# Patient Record
Sex: FEMALE | Race: WHITE | NOT HISPANIC OR LATINO | Employment: FULL TIME | URBAN - METROPOLITAN AREA
[De-identification: names, ages, dates, MRNs, and addresses within clinical notes are randomized per-mention and may not be internally consistent; named-entity substitution may affect disease eponyms.]

---

## 2019-11-14 ENCOUNTER — HOSPITAL ENCOUNTER (EMERGENCY)
Facility: HOSPITAL | Age: 39
Discharge: HOME | End: 2019-11-14
Attending: EMERGENCY MEDICINE
Payer: COMMERCIAL

## 2019-11-14 VITALS
WEIGHT: 140 LBS | OXYGEN SATURATION: 95 % | SYSTOLIC BLOOD PRESSURE: 122 MMHG | RESPIRATION RATE: 18 BRPM | DIASTOLIC BLOOD PRESSURE: 60 MMHG | TEMPERATURE: 99 F | HEART RATE: 90 BPM

## 2019-11-14 DIAGNOSIS — J40 BRONCHITIS: Primary | ICD-10-CM

## 2019-11-14 DIAGNOSIS — B34.9 ACUTE VIRAL SYNDROME: ICD-10-CM

## 2019-11-14 DIAGNOSIS — M94.0 COSTOCHONDRITIS: ICD-10-CM

## 2019-11-14 DIAGNOSIS — R05.9 COUGH: ICD-10-CM

## 2019-11-14 LAB
B-HCG UR QL: NEGATIVE
CTP QC/QA: YES
INFLUENZA A, MOLECULAR: NEGATIVE
INFLUENZA B, MOLECULAR: NEGATIVE
SPECIMEN SOURCE: NORMAL

## 2019-11-14 PROCEDURE — 81025 URINE PREGNANCY TEST: CPT | Performed by: NURSE PRACTITIONER

## 2019-11-14 PROCEDURE — 87502 INFLUENZA DNA AMP PROBE: CPT

## 2019-11-14 PROCEDURE — 99284 EMERGENCY DEPT VISIT MOD MDM: CPT | Mod: 25

## 2019-11-14 PROCEDURE — 25000003 PHARM REV CODE 250: Performed by: NURSE PRACTITIONER

## 2019-11-14 RX ORDER — BENZONATATE 100 MG/1
100 CAPSULE ORAL 3 TIMES DAILY PRN
Qty: 20 CAPSULE | Refills: 0 | Status: SHIPPED | OUTPATIENT
Start: 2019-11-14 | End: 2019-11-24

## 2019-11-14 RX ORDER — FLUTICASONE PROPIONATE 50 MCG
1 SPRAY, SUSPENSION (ML) NASAL 2 TIMES DAILY PRN
Qty: 15 G | Refills: 0 | Status: SHIPPED | OUTPATIENT
Start: 2019-11-14

## 2019-11-14 RX ORDER — ACETAMINOPHEN 160 MG/5ML
650 SOLUTION ORAL
Status: COMPLETED | OUTPATIENT
Start: 2019-11-14 | End: 2019-11-14

## 2019-11-14 RX ADMIN — ACETAMINOPHEN 649.6 MG: 160 SUSPENSION ORAL at 10:11

## 2019-11-14 NOTE — ED NOTES
APPEARANCE: Alert, oriented and in no acute distress.  CARDIAC: Normal rate and rhythm, no murmur heard.   PERIPHERAL VASCULAR: peripheral pulses present. Normal cap refill. No edema. Warm to touch.    RESPIRATORY:. Respirations are equal and unlabored no obvious signs of distress.Nasal congestion noted   GASTRO: soft, bowel sounds normal, no tenderness, no abdominal distention.  MUSC: Full ROM. No bony tenderness or soft tissue tenderness. No obvious deformity.reports bodyaches  SKIN: Skin is warm dry intact. Afebrile   NEURO: No neurological abnormalities.   MENTAL STATUS: awake, alert and aware of environment.  EYE: PERRL, both eyes

## 2019-11-14 NOTE — DISCHARGE INSTRUCTIONS
Increase your fluid intake. Use tylenol and motrin as directed on the labeling.  Return to the ED if your condition changes, progresses, or if you have any concerns.

## 2019-11-14 NOTE — ED PROVIDER NOTES
Encounter Date: 11/14/2019       History     Chief Complaint   Patient presents with    Generalized Body Aches     reports cough, nasal congestion, chest congestion, subjective fevers, generalized body aches started sunday.      39-year-old male presents the ED for cough, nasal congestion, chills, and body aches that started on Sunday.  The patient reports that she came to the ED today due to chest pain with coughing only.  She states that her cough is dry.  No shortness of breath or hemoptysis.  She has not had fever.  She denies history of asthma, pneumonia, and smoking.  She has not had any medication for her symptoms today.  No other complaints at this time.    The history is provided by the patient.     Review of patient's allergies indicates:  No Known Allergies  No past medical history on file.  No past surgical history on file.  No family history on file.  Social History     Tobacco Use    Smoking status: Not on file   Substance Use Topics    Alcohol use: Not on file    Drug use: Not on file     Review of Systems   Constitutional: Positive for activity change, chills and fatigue. Negative for fever.   HENT: Positive for congestion, postnasal drip and rhinorrhea. Negative for ear pain and sore throat.    Eyes: Negative for redness.   Respiratory: Positive for cough. Negative for shortness of breath.    Cardiovascular: Positive for chest pain (With cough only). Negative for palpitations and leg swelling.   Gastrointestinal: Negative for abdominal pain, diarrhea, nausea and vomiting.   Musculoskeletal: Positive for myalgias. Negative for back pain, neck pain and neck stiffness.   Skin: Negative for rash.   Allergic/Immunologic: Negative for immunocompromised state.   Neurological: Negative for dizziness, weakness, numbness and headaches.   Psychiatric/Behavioral: Negative for confusion.   All other systems reviewed and are negative.      Physical Exam     Initial Vitals [11/14/19 0847]   BP Pulse Resp Temp  SpO2   122/62 97 20 99 °F (37.2 °C) 95 %      MAP       --         Physical Exam    Nursing note and vitals reviewed.  Constitutional: Vital signs are normal. She appears well-developed and well-nourished. She is active and cooperative. She is easily aroused.  Non-toxic appearance. She does not have a sickly appearance. She appears ill. No distress.   HENT:   Head: Normocephalic and atraumatic.   Right Ear: External ear normal.   Left Ear: External ear normal.   Nose: Rhinorrhea present. No mucosal edema or sinus tenderness.   Mouth/Throat: Uvula is midline, oropharynx is clear and moist and mucous membranes are normal.   Postnasal drip   Eyes: Conjunctivae and EOM are normal.   Neck: Trachea normal, normal range of motion, full passive range of motion without pain and phonation normal. Neck supple.   Cardiovascular: Normal rate, regular rhythm and normal heart sounds.   Pulmonary/Chest: Effort normal and breath sounds normal. She exhibits tenderness. She exhibits no bony tenderness and no crepitus.   Reproducible anterior chest wall tenderness to palpation. No crepitus.   Abdominal: Soft. Normal appearance and bowel sounds are normal. She exhibits no distension. There is no tenderness. There is no rigidity, no rebound and no guarding.   Musculoskeletal:   No lower extremity edema.   Neurological: She is alert, oriented to person, place, and time and easily aroused. She has normal strength. GCS eye subscore is 4. GCS verbal subscore is 5. GCS motor subscore is 6.   Skin: Skin is warm, dry and intact. Capillary refill takes less than 2 seconds. No bruising and no rash noted. No erythema. No pallor.   Psychiatric: She has a normal mood and affect. Her speech is normal and behavior is normal. Judgment and thought content normal. Cognition and memory are normal.         ED Course   Procedures  Labs Reviewed   INFLUENZA A & B BY MOLECULAR   POCT URINE PREGNANCY          Imaging Results          X-Ray Chest PA And Lateral  (Final result)  Result time 11/14/19 11:12:55    Final result by Benji Corbett MD (11/14/19 11:12:55)                 Impression:      See above      Electronically signed by: Benji Corbett MD  Date:    11/14/2019  Time:    11:12             Narrative:    EXAMINATION:  XR CHEST PA AND LATERAL    CLINICAL HISTORY:  Cough    TECHNIQUE:  PA and lateral views of the chest were performed.    COMPARISON:  None    FINDINGS:  Cardiac size is normal.  Lungs are clear.  The colon is significantly distended in the abdomen.                                 Medical Decision Making:   Initial Assessment:   40yo female here for nasal congestion, cough, and chest pain with cough since Sunday.  No fever, shortness of breath, hemoptysis, or palpitations.  The patient appears ill but nontoxic.  Vitals stable.  No respiratory distress.  Reproducible anterior chest wall pain on exam.  Differential Diagnosis:   Bronchitis, URI, allergies, irritants, pulmonary edema, GERD, laryngitis, tracheitis, asthma, sinusitis, pneumonia, viral, COPD, medication side effect    Clinical Tests:   Lab Tests: Reviewed and Ordered  Radiological Study: Ordered and Reviewed  ED Management:  CXR, labs, PO tylenol  PERC negative. I do not suspect ACS.  Pt reports CP with cough only, suggesting MSK pain. CXR negative for acute changes.  Pt is tolerating oral fluids without difficulty. This is a 39 y.o. female  who presents to the ED today with cough, congestion, symptoms consistent with an URI. Gi Mcgee workup today does not show any sign of pneumonia on CXR. Likely this is a viral course that will need to run its course. The patient is feeling improved following interventions here in the ED. The patient feels back to baseline and is ready to go home. There is no need for emergent intervention at this time. I will discharge home with symptom control. The patient is comfortable with this plan. After taking into careful account the historical  factors and physical exam findings of the patient's presentation today, in conjunction with the empirical and objective data obtained on ED workup, no acute emergent medical condition has been identified. The patient appears to be low risk for an emergent medical condition and I feel it is safe and appropriate at this time for the patient to be discharged to follow-up as detailed in their discharge instructions for reevaluation and possible continued outpatient workup and management. Discharge and follow-up instructions discussed with the patient who expressed understanding and willingness to comply with my recommendations.    This record utilized Dragon voice recognition system.    Past medical records reviewed.      Pt to follow up with PCP within 2 days.  I reviewed strict return precautions. In addition, pt is to return to the ED if condition changes, progresses, or if there are any concerns.  Pt verbalized understanding, compliance, and agreement with the treatment plan.                                     Clinical Impression:       ICD-10-CM ICD-9-CM   1. Bronchitis J40 490   2. Cough R05 786.2   3. Acute viral syndrome B34.9 079.99   4. Costochondritis M94.0 733.6                             Vanessa Land, [place]  11/14/19 7127

## 2021-09-15 ENCOUNTER — CLINICAL SUPPORT (OUTPATIENT)
Dept: URGENT CARE | Facility: CLINIC | Age: 41
End: 2021-09-15
Payer: COMMERCIAL

## 2021-09-15 DIAGNOSIS — Z20.822 ENCOUNTER FOR LABORATORY TESTING FOR COVID-19 VIRUS: Primary | ICD-10-CM

## 2021-09-15 LAB
CTP QC/QA: YES
SARS-COV-2 RDRP RESP QL NAA+PROBE: NEGATIVE

## 2021-09-15 PROCEDURE — U0002: ICD-10-PCS | Mod: QW,S$GLB,, | Performed by: NURSE PRACTITIONER

## 2021-09-15 PROCEDURE — U0002 COVID-19 LAB TEST NON-CDC: HCPCS | Mod: QW,S$GLB,, | Performed by: NURSE PRACTITIONER

## 2025-06-02 ENCOUNTER — HOSPITAL ENCOUNTER (EMERGENCY)
Facility: HOSPITAL | Age: 45
Discharge: HOME | End: 2025-06-02
Attending: STUDENT IN AN ORGANIZED HEALTH CARE EDUCATION/TRAINING PROGRAM

## 2025-06-02 VITALS
RESPIRATION RATE: 16 BRPM | SYSTOLIC BLOOD PRESSURE: 111 MMHG | HEIGHT: 67 IN | DIASTOLIC BLOOD PRESSURE: 62 MMHG | HEART RATE: 87 BPM | OXYGEN SATURATION: 98 % | WEIGHT: 142 LBS | BODY MASS INDEX: 22.29 KG/M2 | TEMPERATURE: 98 F

## 2025-06-02 DIAGNOSIS — L03.116 LEFT LEG CELLULITIS: ICD-10-CM

## 2025-06-02 DIAGNOSIS — L02.419 LEG ABSCESS: Primary | ICD-10-CM

## 2025-06-02 PROCEDURE — 25000003 PHARM REV CODE 250: Performed by: STUDENT IN AN ORGANIZED HEALTH CARE EDUCATION/TRAINING PROGRAM

## 2025-06-02 PROCEDURE — 99284 EMERGENCY DEPT VISIT MOD MDM: CPT | Mod: 25

## 2025-06-02 PROCEDURE — 10060 I&D ABSCESS SIMPLE/SINGLE: CPT

## 2025-06-02 PROCEDURE — 63600175 PHARM REV CODE 636 W HCPCS: Performed by: STUDENT IN AN ORGANIZED HEALTH CARE EDUCATION/TRAINING PROGRAM

## 2025-06-02 RX ORDER — DOXYCYCLINE HYCLATE 100 MG
100 TABLET ORAL
Status: COMPLETED | OUTPATIENT
Start: 2025-06-02 | End: 2025-06-02

## 2025-06-02 RX ORDER — ONDANSETRON 4 MG/1
4 TABLET, ORALLY DISINTEGRATING ORAL EVERY 8 HOURS PRN
Qty: 14 TABLET | Refills: 0 | Status: SHIPPED | OUTPATIENT
Start: 2025-06-02 | End: 2025-06-09

## 2025-06-02 RX ORDER — LIDOCAINE HYDROCHLORIDE 10 MG/ML
5 INJECTION, SOLUTION EPIDURAL; INFILTRATION; INTRACAUDAL; PERINEURAL
Status: COMPLETED | OUTPATIENT
Start: 2025-06-02 | End: 2025-06-02

## 2025-06-02 RX ORDER — DOXYCYCLINE 100 MG/1
100 CAPSULE ORAL 2 TIMES DAILY
Qty: 20 CAPSULE | Refills: 0 | Status: SHIPPED | OUTPATIENT
Start: 2025-06-02 | End: 2025-06-12

## 2025-06-02 RX ADMIN — DOXYCYCLINE HYCLATE 100 MG: 100 TABLET, COATED ORAL at 11:06

## 2025-06-02 RX ADMIN — LIDOCAINE HYDROCHLORIDE 50 MG: 10 INJECTION, SOLUTION EPIDURAL; INFILTRATION; INTRACAUDAL at 11:06

## 2025-06-03 NOTE — DISCHARGE INSTRUCTIONS
Follow up with primary care as needed.  Use medications as prescribed and return to the emergency department if condition worsens in any way.  Some continued oozing over the next 24 hours is good a normal, to help get the infection out.  After 24 hours you can start washing the area with gentle soap and water.  I do not recommend anything that bubbles or burns such as alcohol, Betadine, or peroxide.    Be sure to take the complete course of the antibiotic even if all your symptoms are improving.  Some nausea is normal with taking this antibiotic, it is not an allergic reaction, it has a common side effect.  Take the nausea medicine with it if needed.  Take it with food.  And I do recommend taking an over-the-counter probiotic once daily, available in most pharmacies, while taking this antibiotic

## 2025-06-03 NOTE — ED PROVIDER NOTES
Encounter Date: 6/2/2025       History     Chief Complaint   Patient presents with    Ankle Pain     Pt presents to the ED c/o pain to her ankle on the L side. Pt states on Friday she was bitten (not sure what bit her). Pain, warmth, and redness noted to ankle now. Pt reports it being hard for her to walk r/t the pain     HPI  45-year-old female no pertinent medical history presents brought in by self through triage for painful swelling on the left lateral lower leg.  Review of patient's allergies indicates:  No Known Allergies  History reviewed. No pertinent past medical history.  History reviewed. No pertinent surgical history.  No family history on file.  Social History[1]  Medical surgical family and social history review  Review of Systems  Complete review of systems was conducted and was negative except as per HPI and as marked  Physical Exam     Initial Vitals [06/02/25 2248]   BP Pulse Resp Temp SpO2   111/62 87 16 98.2 °F (36.8 °C) 98 %      MAP       --         Physical Exam  Physical:  General: Awake, alert, no acute distress  Head: Normocephalic, atraumatic  Neck: Trachea midline, full range of motion, no meningismus  ENT: Atraumatic, Airway Patent  Cardio: Regular Rate, Regular Rhythm, skin perfusion normal  Chest: Atraumatic, No respiratory distress no use of accessory muscles to breath, normal rate  Upper Ext: Atraumatic, Inspection normal, no swelling  Lower Ext:  Left lower leg with a single focal nidus that is subcentimeter., with lui fluctuance,.  There is surrounding non blanchable erythema and increased thickness proximal to the ankle.  No abnormality of the ankle joint.  Limb is warm well perfused and neurovascularly intact  Neuro: No gross cranial nerve abnormality, no lateralization, no gross sensory or motor deficits  ED Course   I & D - Incision and Drainage    Date/Time: 6/2/2025 11:52 PM  Location procedure was performed: [facility] EMERGENCY DEPARTMENT    Performed by: Clay Bess,  "MD  Authorized by: Clay Bess MD  Consent Done: Yes  Consent: Verbal consent obtained  Consent given by: patient  Patient understanding: patient states understanding of the procedure being performed  Site marked: the operative site was marked  Imaging studies: imaging studies available (Point of care ultrasound used for site identification prior to procedure)  Patient identity confirmed: , name and verbally with patient  Time out: Immediately prior to procedure a "time out" was called to verify the correct patient, procedure, equipment, support staff and site/side marked as required.  Type: abscess  Body area: lower extremity  Location details: left leg  Anesthesia: local infiltration    Anesthesia:  Local Anesthetic: lidocaine 1% without epinephrine  Anesthetic total: 0.5 mL    Patient sedated: no  Description of findings: Abscess   Incision type: single straight  Incision depth: subcutaneous  Complexity: simple  Drainage: pus  Drainage amount: moderate  Wound treatment: incision  Complications: No  Estimated blood loss (mL): 1  Specimens: No  Implants: No  Patient tolerance: Patient tolerated the procedure well with no immediate complications    Incision depth: subcutaneous        Labs Reviewed - No data to display       Imaging Results    None          Medications   LIDOcaine (PF) 10 mg/ml (1%) injection 50 mg (50 mg Infiltration Given by Other 25 8504)   doxycycline tablet 100 mg (100 mg Oral Given 25 4726)     Medical Decision Making  Risk  Prescription drug management.       45-year-old female presented for abscess with cellulitis on the ankle.  Patient was concerned that this was bite related, but does not have history of known bite, the exam is overall consistent with a follicular abscess/folliculitis/cellulitis.  She is otherwise hemodynamically stable in no acute distress, well-appearing, and does not meet other SIRS criteria.  Risks benefits and alternatives were discussed, and I and D was " subsequently performed.  Given doxycycline.    Counseled on antibiotic use, patient was concerned about this as they have difficulty in taking pills, accommodations made.            Antiemetics prescribed along with this.                   Clinical Impression:  Final diagnoses:  [L02.419] Leg abscess (Primary)  [L03.116] Left leg cellulitis          ED Disposition Condition    Discharge Stable          ED Prescriptions       Medication Sig Dispense Start Date End Date Auth. Provider    doxycycline (VIBRAMYCIN) 100 MG Cap Take 1 capsule (100 mg total) by mouth 2 (two) times daily. for 10 days 20 capsule 6/2/2025 6/12/2025 Clay Bess MD    ondansetron (ZOFRAN-ODT) 4 MG TbDL Take 1 tablet (4 mg total) by mouth every 8 (eight) hours as needed. 14 tablet 6/2/2025 6/9/2025 Clay Bess MD          Follow-up Information    None                [1]   Social History  Tobacco Use    Smoking status: Never    Smokeless tobacco: Never        Clay Bess MD  06/02/25 [id number]

## 2025-06-03 NOTE — ED NOTES
Pt states on Friday she developed a bump on her L ankle. Pt states it progressively worsened. Pt states she applied a warm compress on Friday. Pt states she applied cortisone but no relief. Pt endorses pain. Redness noted to ankle. Pt denies surgical hx. Pt denies allergies. Pt denies daily medications. Pt states her sugar does drop but she is not on medication at home. Pt is AAOx4. Pt is calm and cooperative

## 2025-06-03 NOTE — ED NOTES
Pt requesting to take medication at home once discharged due to difficulty swallowing pills per pt, MD aware and v/o. Pt v/o.   - sub q heparin for DVT ppx